# Patient Record
Sex: FEMALE | Race: WHITE | Employment: PART TIME | URBAN - NONMETROPOLITAN AREA
[De-identification: names, ages, dates, MRNs, and addresses within clinical notes are randomized per-mention and may not be internally consistent; named-entity substitution may affect disease eponyms.]

---

## 2020-11-16 ENCOUNTER — HOSPITAL ENCOUNTER (OUTPATIENT)
Dept: WOMENS IMAGING | Age: 45
Discharge: HOME | End: 2020-11-16
Payer: COMMERCIAL

## 2020-11-16 PROCEDURE — 77063 BREAST TOMOSYNTHESIS BI: CPT

## 2024-10-22 ENCOUNTER — HOSPITAL ENCOUNTER (OUTPATIENT)
Dept: WOMENS IMAGING | Age: 49
Discharge: HOME | End: 2024-10-22
Payer: MEDICAID

## 2024-10-22 VITALS — HEIGHT: 64 IN | BODY MASS INDEX: 22.83 KG/M2 | WEIGHT: 133.7 LBS

## 2024-10-22 DIAGNOSIS — Z12.31 VISIT FOR SCREENING MAMMOGRAM: ICD-10-CM

## 2024-10-22 LAB
ALBUMIN: 4.5 G/DL (ref 3.5–5)
ALP BLD-CCNC: 102 IU/L (ref 39–118)
ALT SERPL-CCNC: 23 IU/L (ref 10–40)
ANION GAP SERPL CALCULATED.3IONS-SCNC: 9 MMOL/L (ref 4–12)
AST SERPL-CCNC: 16 IU/L (ref 15–41)
BASOPHILS ABSOLUTE: 100 /CMM (ref 0–200)
BASOPHILS RELATIVE PERCENT: 0.7 % (ref 0–2)
BILIRUB SERPL-MCNC: 0.7 MG/DL (ref 0.2–1)
BILIRUBIN DIRECT: 0.1 MG/DL (ref 0.1–0.2)
BUN BLDV-MCNC: 20 MG/DL (ref 7–20)
CALCIUM SERPL-MCNC: 9.8 MG/DL (ref 8.8–10.5)
CHLORIDE BLD-SCNC: 106 MEQ/L (ref 101–111)
CHOLESTEROL, TOTAL: 181 MG/DL
CHOLESTEROL/HDL RELATIVE RISK: 3.1 (ref 4–4.4)
CO2: 27 MEQ/L (ref 21–32)
CREAT SERPL-MCNC: 0.61 MG/DL (ref 0.6–1.3)
CREATININE CLEARANCE: >60
DIRECT-LDL / HDL RISK: 1.8
EOSINOPHILS ABSOLUTE: 100 /CMM (ref 0–500)
EOSINOPHILS RELATIVE PERCENT: 1.7 % (ref 0–6)
GLUCOSE FASTING: 85 MG/DL (ref 70–110)
HCT VFR BLD CALC: 42.4 % (ref 35–44)
HDLC SERPL-MCNC: 58 MG/DL
HEMOGLOBIN: 14.2 GM/DL (ref 12–15)
LDL CHOLESTEROL DIRECT: 106 MG/DL
LYMPHOCYTES ABSOLUTE: 2200 /CMM (ref 1000–4800)
LYMPHOCYTES RELATIVE PERCENT: 30.5 % (ref 15–45)
MCH RBC QN AUTO: 28.3 PG (ref 27.5–33)
MCHC RBC AUTO-ENTMCNC: 33.5 GM/DL (ref 33–36)
MCV RBC AUTO: 84.4 CU MIC (ref 80–97)
MONOCYTES ABSOLUTE: 400 /CMM (ref 0–800)
MONOCYTES RELATIVE PERCENT: 6.2 % (ref 2–10)
NEUTROPHILS ABSOLUTE: 4500 /CMM (ref 1800–7700)
NEUTROPHILS RELATIVE PERCENT: 60.9 % (ref 40–70)
NUCLEATED RBCS: 0.1 /100 WBC
PDW BLD-RTO: 14 % (ref 12–16)
PLATELET # BLD: 266 TH/CMM (ref 150–400)
POTASSIUM SERPL-SCNC: 4.2 MEQ/L (ref 3.6–5)
RBC # BLD: 5.02 MIL/CMM (ref 4–5.1)
SODIUM BLD-SCNC: 142 MEQ/L (ref 135–145)
T3 TOTAL: 121.41 NG/DL (ref 87–178)
T4 FREE: 0.99 NG/DL (ref 0.61–1.12)
TOTAL PROTEIN: 6.9 G/DL (ref 6.2–8)
TRIGL SERPL-MCNC: 84 MG/DL
TSH SERPL DL<=0.05 MIU/L-ACNC: 1.45 MCIU/ML (ref 0.49–4.67)
VLDLC SERPL CALC-MCNC: 16 MG/DL
WBC # BLD: 7.3 TH/CMM (ref 4.4–10.5)

## 2024-10-22 PROCEDURE — 77063 BREAST TOMOSYNTHESIS BI: CPT

## 2025-01-14 ENCOUNTER — OFFICE VISIT (OUTPATIENT)
Dept: PSYCHOLOGY | Age: 50
End: 2025-01-14
Payer: MEDICAID

## 2025-01-14 DIAGNOSIS — F43.22 ADJUSTMENT DISORDER WITH ANXIETY: Primary | ICD-10-CM

## 2025-01-14 PROCEDURE — 96130 PSYCL TST EVAL PHYS/QHP 1ST: CPT | Performed by: PSYCHOLOGIST

## 2025-01-14 PROCEDURE — 96131 PSYCL TST EVAL PHYS/QHP EA: CPT | Performed by: PSYCHOLOGIST

## 2025-01-14 PROCEDURE — 90791 PSYCH DIAGNOSTIC EVALUATION: CPT | Performed by: PSYCHOLOGIST

## 2025-01-14 NOTE — PROGRESS NOTES
Psychological Evaluation  LELA CRAWFORD, PhD   Visit Date:  1/14/2025    Patient:  Soco Dale  YOB: 1975  Reason for referral:  psychological evaluation for potential disability  Duration of session:  60 minutes for clinical interview and biographical information; 60 minutes administering tests; 60 minutes scoring tests, reviewing background information, comparing and reporting results.         Relevant Background Information    Description:    MSE:    Appearance    cooperative  Appetite normal  Sleep disturbance No  Loss of pleasure No  Speech    normal rate, normal volume, and well articulated  Mood    Anxious  Affect    anxiety  Thought Content    intact  Insight    Fair  Judgment    Intact  Suicide Assessment    no suicidal ideation  Homicidal Assessment Intent No  Cutting No  Enuresis No  Learning Disorder currently being assessed for LD  Memory says she has short-term memory issues  CognitiveIntact long-term and Intact short-term  Hallucination Absent  DelusionsAbsent        Methods of Evaluation  Biographical Information form/Clinical Interview  Review of past records  Wong Brief Intelligence Test-2  Wide Range Achievement Test-4  Patient Health Questionnaire (PQI-90)   RITU-7 Anxiety    Test Findings  Wong Brief Intelligence Test-2    Scales Standard Score 90% Confidence Interval Percentile Rank   Verbal  81 75-88 10   Nonverbal 80 73-89 9   IQ Composite 80 75-86 9     Wide Range Achievement Test-4    Subtest Standard Score Confidence Interval   Word Reading 86 80-94   Spelling 85 77-94   Math Computation 73 65-86   Sentence Comprehension NA    Reading Composite NA      Patient Health Questionnaire (PQI-90)   Total; 5 Interpretation: mild depression; she says this cause somewhat difficulties.    RITU-7 Anxiety  Score: 8- interpretation: mild anxiety- not difficulties at all.    Clinical Interview/Biographical Information Form/Review of Records  The patient is a 49-year-old